# Patient Record
(demographics unavailable — no encounter records)

---

## 2024-11-14 NOTE — ADDENDUM
[FreeTextEntry1] : I, CLIFF BABIN, acted solely as a scribe for Dr. Anatoly Cordoba on this date 11/14/2024.  All medical record entries made by the Scribe were at my, Dr. Anatoly Cordoba, direction and personally dictated by me on 11/14/2024. I have reviewed the chart and agree that the record accurately reflects my personal performance of the history, physical exam, assessment and plan. I have also personally directed, reviewed, and agreed with the chart.

## 2024-11-14 NOTE — PHYSICAL EXAM
[de-identified] : Constitutional o Appearance : well-nourished, well developed, alert, in no acute distress  Head and Face o Head :  Inspection : atraumatic, normocephalic o Face :  Inspection : no visible rash or discoloration Respiratory o Respiratory Effort: breathing unlabored  Neurologic o Mental Status Examination :  Orientation : alert and oriented X 3 Psychiatric o Mood and Affect: mood normal, affect appropriate Cardiovascular o Observation/Palpation : no swelling Lymphatic o Additional Nodes : No palpable lymph nodes present  Lumbosacral Spine o Inspection : no visible rash or discoloration o Palpation : no paraspinal musculature tenderness o Range of Motion : extension causes discomfort side bending causes discomfort rotation does not  o Muscle Strength : paraspinal muscle strength and tone within normal limits o Muscle Tone : paraspinal muscle strength and tone within normal limits o Tests: straight leg test negative bilaterally, CHAD test negative bilaterally   Right Lower Extremity o Buttock : no tenderness, swelling or deformities o Right Hip :  Inspection/Palpation : no tenderness, no swelling or deformities  Range of Motion : full and painless in all planes, no crepitance  Stability : joint stability intact  Strength : extension, flexion, adduction, abduction, internal rotation and external rotation 5/5   Tests: Obers test negative  Left Lower Extremity o Buttock : no tenderness, swelling or deformities  o Left Hip :  Inspection/Palpation : greater trochanter tenderness, no swelling or deformities  Range of Motion : full and mild discomfort at the extremes of rotation no crepitance  Stability : joint stability intact  Strength : extension, flexion, adduction, abduction, internal rotation and external rotation 5/5  Tests: Obers test negative  Gait: gait normal, no foot drop, good core strength, abudctor lurch to the right, no significant extremity swelling or lymphedema, good proprioception and balance  Radiology Results 11/14/2024 o Lumbosacral Spine: AP and lateral views were obtained and revealed Baum willy ends at L3, degenerative disc disease L3-4 and grade 1 spondylolisthesis of L3,  o Pelvis : AP pelvis was obtained and revealed minimal degenerative arthritis of both hips (these are standing)

## 2024-11-14 NOTE — DISCUSSION/SUMMARY
[de-identified] : I went over the pathophysiology of the patient's symptoms in great detail with the patient. I discussed the underlying pathophysiology of the patient's condition in great detail with the patient. I went over the patient's x-rays with them in great detail. We are prescribing 75mg Diclofenac at this time. A prescription was provided. We are requesting authorization for an MRI of the patients lumbar spine to rule out spinal stenosis. At this time, he will start a course of physical therapy for strengthening and flexibility. A prescription was provided. We discussed the use of ice, Tylenol and anti-inflammatories to relieve pain.   All of their questions were answered. They understand and consent to the plan.   FU after MRI with metal supranetaon

## 2024-11-14 NOTE — HISTORY OF PRESENT ILLNESS
[de-identified] : 56 year old RHD male, presents for follow-up visit c/o back pain x 2 weeks. Patient reports acute on chronic intermittent lower back pain, radiating to his left hip, described as "stabbing" and rated 10/10 on the pain scale. Patient denies any recent falls, trauma, or other injuries that precipitated the onset of his back pain. Patient states that lower back pain is worst with walking. He denies taking any prescription or over-the-counter pain medications at this time. Patient states that he started PT and chiropractic treatments about 1 week ago. Patient also had XR Left hip, which showed degenerative changes. Patient otherwise states that he has been in his usual state of health. Patient denies prior back surgeries or SIMONA. Patient ambulates without assistance at baseline. Patient denies any associated numbness, tingling, weakness to B/L LE, new onset urinary or bowel incontinence, and saddle paresthesia.  [facility] X RAY FROG LEG VIEW done on [id number] No evident fractures or lytic lesions jont spaces are preserved.

## 2024-12-05 NOTE — DISCUSSION/SUMMARY
[de-identified] : I went over the pathophysiology of the patient's symptoms in great detail with the patient. I discussed the underlying pathophysiology of the patient's condition in great detail with the patient. I went over the patient's MRI results with them in great detail and used models to aid in my explanation.				Patient will continue physical therapy for strengthening and flexibility. We discussed the use of ice, Tylenol and anti-inflammatories to relieve pain.  They are going to [place] in 2 weeks we have given him prednisone to take with them he will not start the course unless his symptoms get worse.  All of their questions were answered. They understand and consent to the plan.   FU in 4 to 6 weeks.

## 2024-12-05 NOTE — PHYSICAL EXAM
[de-identified] : Constitutional o Appearance : well-nourished, well developed, alert, in no acute distress  Head and Face o Head :  Inspection : atraumatic, normocephalic o Face :  Inspection : no visible rash or discoloration Respiratory o Respiratory Effort: breathing unlabored  Neurologic o Mental Status Examination :  Orientation : alert and oriented X 3 Psychiatric o Mood and Affect: mood normal, affect appropriate Cardiovascular o Observation/Palpation : no swelling Lymphatic o Additional Nodes : No palpable lymph nodes present  Lumbosacral Spine o Inspection : no visible rash or discoloration o Palpation : no paraspinal musculature tenderness o Range of Motion : extension causes mild  discomfort side bending causes no  discomfort rotation does not  o Muscle Strength : paraspinal muscle strength and tone within normal limits o Muscle Tone : paraspinal muscle strength and tone within normal limits o Tests: straight leg test negative bilaterally, CHAD test negative bilaterally   Right Lower Extremity o Buttock : no tenderness, swelling or deformities o Right Hip :  Inspection/Palpation : no tenderness, no swelling or deformities  Range of Motion : full and painless in all planes, no crepitance  Stability : joint stability intact  Strength : extension, flexion, adduction, abduction, internal rotation and external rotation 5/5   Tests: Obers test negative  Left Lower Extremity o Buttock : no tenderness, swelling or deformities  o Left Hip :  Inspection/Palpation :minimal greater trochanter tenderness, no swelling or deformities  Range of Motion : full and mild discomfort at the extremes of rotation no crepitance  Stability : joint stability intact  Strength : extension, flexion, adduction, abduction, internal rotation and external rotation 5/5  Tests: Obers test negative  Gait: gait normal, no foot drop, good core strength, abudctor lurch to the right, no significant extremity swelling or lymphedema, good proprioception and balance

## 2024-12-05 NOTE — ADDENDUM
[FreeTextEntry1] : I, CLIFF BABIN, acted solely as a scribe for Dr. Anatoly Crodoba on this date 11/14/2024.  All medical record entries made by the Scribe were at my, Dr. Anatoly Cordoba, direction and personally dictated by me on 11/14/2024. I have reviewed the chart and agree that the record accurately reflects my personal performance of the history, physical exam, assessment and plan. I have also personally directed, reviewed, and agreed with the chart.

## 2024-12-05 NOTE — HISTORY OF PRESENT ILLNESS
[de-identified] : 56 year old RHD male, presents for MRI Lumbar Spine review follow-up visit today. Patient reports mild, persistent lower back pain since his last visit on 11/14/2024. Patient states that his lower back pain is localized to the left-side. Patient states that lower back pain is worst with walking. Patient was prescribed diclofenac, however, he states that it provided little to no improvement in his pain, so he stopped taking the medication after a few days. Patient states that he started PT and chiropractic treatments about 1 week ago. Patient also had XR Left hip, which showed degenerative changes. Patient otherwise states that he has been in his usual state of health. Patient denies prior back surgeries or SIMONA. Patient ambulates without assistance at baseline. Patient denies any associated numbness, tingling, weakness to B/L LE, new onset urinary or bowel incontinence, and saddle paresthesia.  MRI Lumbar Spine [facility] on 11/27/2024 IMPRESSION: 1. At L5-S1 severe bilateral facet osteoarthritis with 1.2 cm synovial cyst/ganglion along the deep foraminal margin of the right side contribute to moderate ipsilateral neural canal stenosis. Correlate for possible right-sided L5 radiculopathy. 2. Partially visualized right lumbar scoliosis surgery follow margin terminating at L3. No high-grade lumbar spinal canal or neural canal stenosis. 3. Degenerative grade 1 anterolisthesis and severe bilateral facet osteoarthritis at L4-L5. ADDITIONAL CLINICAL INFORMATION: Low back muscle strain S39.012A --- End of Report ---  Radiology Results 11/14/2024 o Lumbosacral Spine: AP and lateral views were obtained and revealed Baum willy ends at L3, degenerative disc disease L3-4 and grade 1 spondylolisthesis of L3,  o Pelvis : AP pelvis was obtained and revealed minimal degenerative arthritis of both hips (these are standing)  DANI[facility]LE [facility] X RAY FROG LEG VIEW done on 11/14/2024 No evident fractures or lytic lesions joint spaces are preserved.

## 2025-01-16 NOTE — DISCUSSION/SUMMARY
[de-identified] : I went over the pathophysiology of the patient's symptoms in great detail with the patient. I went over the patient's MRI results with them in great detail and used models to aid in my explanation. At this time, he will start a course of physical therapy for strengthening and flexibility. A prescription was provided. A discussion ensued about shoe wear modifications. I recommend  the patient uses custom orthotics in his shoes. A prescription was provided. We discussed the use of ice, Tylenol and anti-inflammatories to relieve pain. I stressed the importance of weight loss and its benefits to the patients joints and overall health.   All of their questions were answered. They understand and consent to the plan.   FU in 4-6 weeks.

## 2025-01-16 NOTE — ADDENDUM
[FreeTextEntry1] : I, CLIFF BABIN, acted solely as a scribe for Dr. Anatoly Cordoba on this date 01/16/2025.  All medical record entries made by the Scribe were at my, Dr. Anatoly Cordoba, direction and personally dictated by me on 01/16/2025. I have reviewed the chart and agree that the record accurately reflects my personal performance of the history, physical exam, assessment and plan. I have also personally directed, reviewed, and agreed with the chart.

## 2025-01-16 NOTE — HISTORY OF PRESENT ILLNESS
[de-identified] : 57 year old male presents complaining of left ankle pain. He stepped into a pot hole and rolled his ankle several months ago. He had a sonogram and MRI done of his left ankle. Patient denies that he has any numbness or tingling. I went over the patient's MRI results with them in great detail and used models to aid in my explanation. Patient denies taking any medication to manage his pain. He denies that the pain wakes him from sleep. He denies wearing Orthotics.   MRI Lumbar Spine [facility] on 11/27/2024 IMPRESSION: 1. At L5-S1 severe bilateral facet osteoarthritis with 1.2 cm synovial cyst/ganglion along the deep foraminal margin of the right side contribute to moderate ipsilateral neural canal stenosis. Correlate for possible right-sided L5 radiculopathy. 2. Partially visualized right lumbar scoliosis surgery follow margin terminating at L3. No high-grade lumbar spinal canal or neural canal stenosis. 3. Degenerative grade 1 anterolisthesis and severe bilateral facet osteoarthritis at L4-L5. ADDITIONAL CLINICAL INFORMATION: Low back muscle strain S39.012A --- End of Report ---  Radiology Results 11/14/2024 o Lumbosacral Spine: AP and lateral views were obtained and revealed Baum willy ends at L3, degenerative disc disease L3-4 and grade 1 spondylolisthesis of L3,  o Pelvis : AP pelvis was obtained and revealed minimal degenerative arthritis of both hips (these are standing)  DANIELLE HILL X RAY FROG LEG VIEW done on 11/14/2024 No evident fractures or lytic lesions joint spaces are preserved.

## 2025-01-16 NOTE — PHYSICAL EXAM
[de-identified] : Constitutional o Appearance : well-nourished, well developed, alert, in no acute distress  Head and Face o Head :  Inspection : atraumatic, normocephalic o Face :  Inspection : no visible rash or discoloration Respiratory o Respiratory Effort: breathing unlabored  Neurologic o Mental Status Examination :  Orientation : alert and oriented X 3 Psychiatric o Mood and Affect: mood normal, affect appropriate Cardiovascular o Observation/Palpation : no swelling Lymphatic o Additional Nodes : No palpable lymph nodes present  Lumbosacral Spine o Inspection : no visible rash or discoloration o Palpation : no paraspinal musculature tenderness o Range of Motion : extension causes mild  discomfort side bending causes no  discomfort rotation does not  o Muscle Strength : paraspinal muscle strength and tone within normal limits o Muscle Tone : paraspinal muscle strength and tone within normal limits o Tests: straight leg test negative bilaterally, CHAD test negative bilaterally   Right Lower Extremity o Buttock : no tenderness, swelling or deformities o Right Hip :  Inspection/Palpation : no tenderness, no swelling or deformities  Range of Motion : full and painless in all planes, no crepitance  Stability : joint stability intact  Strength : extension, flexion, adduction, abduction, internal rotation and external rotation 5/5   Tests: Obers test negative  Left Lower Extremity o Buttock : no tenderness, swelling or deformities  o Left Hip :  Inspection/Palpation :minimal greater trochanter tenderness, no swelling or deformities  Range of Motion : full and mild discomfort at the extremes of rotation no crepitance  Stability : joint stability intact  Strength : extension, flexion, adduction, abduction, internal rotation and external rotation 5/5  Tests: Obers test negative  o Left Ankle : no swelling all planes  Inspection/Palpation : no deltoid ligament tenderness to palpation,  Range of Motion : arc of motion full and painless in  Stability : no joint instability on provocative testing  Strength : all muscles 5/5 o Skin : no erythema or ecchymosis present o Sensation : sensation to pin intact o Tests and Signs : negative Casper test, negative Anterior Drawer   Gait: gait normal, no foot drop, good core strength, abudctor lurch to the right, no significant extremity swelling or lymphedema, good proprioception and balance